# Patient Record
Sex: FEMALE | URBAN - METROPOLITAN AREA
[De-identification: names, ages, dates, MRNs, and addresses within clinical notes are randomized per-mention and may not be internally consistent; named-entity substitution may affect disease eponyms.]

---

## 2024-01-16 ENCOUNTER — HOSPITAL ENCOUNTER (OUTPATIENT)
Facility: AMBULATORY SURGERY CENTER | Age: 34
End: 2024-01-16
Attending: OBSTETRICS & GYNECOLOGY

## 2024-01-23 RX ORDER — SODIUM CHLORIDE, SODIUM LACTATE, POTASSIUM CHLORIDE, CALCIUM CHLORIDE 600; 310; 30; 20 MG/100ML; MG/100ML; MG/100ML; MG/100ML
INJECTION, SOLUTION INTRAVENOUS CONTINUOUS
Status: CANCELLED | OUTPATIENT
Start: 2024-01-23

## 2024-01-23 RX ORDER — LIDOCAINE 40 MG/G
CREAM TOPICAL
Status: CANCELLED | OUTPATIENT
Start: 2024-01-23

## 2024-01-23 RX ORDER — ACETAMINOPHEN 325 MG/1
975 TABLET ORAL ONCE
Status: CANCELLED | OUTPATIENT
Start: 2024-01-23 | End: 2024-01-23

## 2024-01-24 ENCOUNTER — ANESTHESIA EVENT (OUTPATIENT)
Dept: SURGERY | Facility: AMBULATORY SURGERY CENTER | Age: 34
End: 2024-01-24

## 2024-01-24 ENCOUNTER — ANESTHESIA (OUTPATIENT)
Dept: SURGERY | Facility: AMBULATORY SURGERY CENTER | Age: 34
End: 2024-01-24

## 2024-01-24 ENCOUNTER — HOSPITAL ENCOUNTER (OUTPATIENT)
Facility: AMBULATORY SURGERY CENTER | Age: 34
Discharge: HOME | End: 2024-01-24
Attending: OBSTETRICS & GYNECOLOGY

## 2024-01-24 VITALS
DIASTOLIC BLOOD PRESSURE: 74 MMHG | RESPIRATION RATE: 16 BRPM | OXYGEN SATURATION: 100 % | HEIGHT: 65 IN | SYSTOLIC BLOOD PRESSURE: 122 MMHG | HEART RATE: 50 BPM | BODY MASS INDEX: 28.32 KG/M2 | TEMPERATURE: 97.7 F | WEIGHT: 170 LBS

## 2024-01-24 DIAGNOSIS — N87.1 CIN II (CERVICAL INTRAEPITHELIAL NEOPLASIA II): ICD-10-CM

## 2024-01-24 DIAGNOSIS — Z98.890 S/P LEEP: Primary | ICD-10-CM

## 2024-01-24 LAB
HCG UR QL: NEGATIVE
INTERNAL QC OK POCT: NORMAL
POCT KIT EXPIRATION DATE: NORMAL
POCT KIT LOT NUMBER: NORMAL

## 2024-01-24 RX ORDER — DEXAMETHASONE SODIUM PHOSPHATE 4 MG/ML
INJECTION, SOLUTION INTRA-ARTICULAR; INTRALESIONAL; INTRAMUSCULAR; INTRAVENOUS; SOFT TISSUE PRN
Status: DISCONTINUED | OUTPATIENT
Start: 2024-01-24 | End: 2024-01-24

## 2024-01-24 RX ORDER — ACETAMINOPHEN 325 MG/1
975 TABLET ORAL ONCE
Status: DISCONTINUED | OUTPATIENT
Start: 2024-01-24 | End: 2024-01-25 | Stop reason: HOSPADM

## 2024-01-24 RX ORDER — OXYCODONE HYDROCHLORIDE 5 MG/1
5 TABLET ORAL
Status: DISCONTINUED | OUTPATIENT
Start: 2024-01-24 | End: 2024-01-25 | Stop reason: HOSPADM

## 2024-01-24 RX ORDER — OXYCODONE HYDROCHLORIDE 5 MG/1
5 TABLET ORAL EVERY 4 HOURS PRN
Qty: 6 TABLET | Refills: 0 | Status: SHIPPED | OUTPATIENT
Start: 2024-01-24

## 2024-01-24 RX ORDER — OXYCODONE HYDROCHLORIDE 10 MG/1
10 TABLET ORAL
Status: DISCONTINUED | OUTPATIENT
Start: 2024-01-24 | End: 2024-01-25 | Stop reason: HOSPADM

## 2024-01-24 RX ORDER — ONDANSETRON 2 MG/ML
4 INJECTION INTRAMUSCULAR; INTRAVENOUS EVERY 30 MIN PRN
Status: DISCONTINUED | OUTPATIENT
Start: 2024-01-24 | End: 2024-01-25 | Stop reason: HOSPADM

## 2024-01-24 RX ORDER — LIDOCAINE 40 MG/G
CREAM TOPICAL
Status: DISCONTINUED | OUTPATIENT
Start: 2024-01-24 | End: 2024-01-25 | Stop reason: HOSPADM

## 2024-01-24 RX ORDER — FENTANYL CITRATE 0.05 MG/ML
50 INJECTION, SOLUTION INTRAMUSCULAR; INTRAVENOUS EVERY 5 MIN PRN
Status: DISCONTINUED | OUTPATIENT
Start: 2024-01-24 | End: 2024-01-25 | Stop reason: HOSPADM

## 2024-01-24 RX ORDER — FENTANYL CITRATE 0.05 MG/ML
25 INJECTION, SOLUTION INTRAMUSCULAR; INTRAVENOUS EVERY 5 MIN PRN
Status: DISCONTINUED | OUTPATIENT
Start: 2024-01-24 | End: 2024-01-25 | Stop reason: HOSPADM

## 2024-01-24 RX ORDER — FENTANYL CITRATE 50 UG/ML
INJECTION, SOLUTION INTRAMUSCULAR; INTRAVENOUS PRN
Status: DISCONTINUED | OUTPATIENT
Start: 2024-01-24 | End: 2024-01-24

## 2024-01-24 RX ORDER — ACETAMINOPHEN 325 MG/1
975 TABLET ORAL ONCE
Status: COMPLETED | OUTPATIENT
Start: 2024-01-24 | End: 2024-01-24

## 2024-01-24 RX ORDER — ONDANSETRON 4 MG/1
4 TABLET, ORALLY DISINTEGRATING ORAL EVERY 30 MIN PRN
Status: DISCONTINUED | OUTPATIENT
Start: 2024-01-24 | End: 2024-01-25 | Stop reason: HOSPADM

## 2024-01-24 RX ORDER — IBUPROFEN 800 MG/1
800 TABLET, FILM COATED ORAL EVERY 6 HOURS PRN
Qty: 30 TABLET | Refills: 0 | Status: SHIPPED | OUTPATIENT
Start: 2024-01-24

## 2024-01-24 RX ORDER — PROPOFOL 10 MG/ML
INJECTION, EMULSION INTRAVENOUS CONTINUOUS PRN
Status: DISCONTINUED | OUTPATIENT
Start: 2024-01-24 | End: 2024-01-24

## 2024-01-24 RX ORDER — PROPOFOL 10 MG/ML
INJECTION, EMULSION INTRAVENOUS PRN
Status: DISCONTINUED | OUTPATIENT
Start: 2024-01-24 | End: 2024-01-24

## 2024-01-24 RX ORDER — HYDROMORPHONE HCL IN WATER/PF 6 MG/30 ML
0.4 PATIENT CONTROLLED ANALGESIA SYRINGE INTRAVENOUS EVERY 5 MIN PRN
Status: DISCONTINUED | OUTPATIENT
Start: 2024-01-24 | End: 2024-01-25 | Stop reason: HOSPADM

## 2024-01-24 RX ORDER — HYDROMORPHONE HCL IN WATER/PF 6 MG/30 ML
0.2 PATIENT CONTROLLED ANALGESIA SYRINGE INTRAVENOUS EVERY 5 MIN PRN
Status: DISCONTINUED | OUTPATIENT
Start: 2024-01-24 | End: 2024-01-25 | Stop reason: HOSPADM

## 2024-01-24 RX ORDER — IBUPROFEN 800 MG/1
800 TABLET, FILM COATED ORAL ONCE
Status: DISCONTINUED | OUTPATIENT
Start: 2024-01-24 | End: 2024-01-25 | Stop reason: HOSPADM

## 2024-01-24 RX ORDER — ONDANSETRON 2 MG/ML
INJECTION INTRAMUSCULAR; INTRAVENOUS PRN
Status: DISCONTINUED | OUTPATIENT
Start: 2024-01-24 | End: 2024-01-24

## 2024-01-24 RX ORDER — ACETAMINOPHEN 325 MG/1
975 TABLET ORAL ONCE
Status: CANCELLED | OUTPATIENT
Start: 2024-01-24 | End: 2024-01-24

## 2024-01-24 RX ORDER — SODIUM CHLORIDE, SODIUM LACTATE, POTASSIUM CHLORIDE, CALCIUM CHLORIDE 600; 310; 30; 20 MG/100ML; MG/100ML; MG/100ML; MG/100ML
INJECTION, SOLUTION INTRAVENOUS CONTINUOUS
Status: DISCONTINUED | OUTPATIENT
Start: 2024-01-24 | End: 2024-01-25 | Stop reason: HOSPADM

## 2024-01-24 RX ORDER — ACETIC ACID 3 %
LIQUID (ML) MISCELLANEOUS PRN
Status: DISCONTINUED | OUTPATIENT
Start: 2024-01-24 | End: 2024-01-24 | Stop reason: HOSPADM

## 2024-01-24 RX ORDER — KETOROLAC TROMETHAMINE 30 MG/ML
INJECTION, SOLUTION INTRAMUSCULAR; INTRAVENOUS PRN
Status: DISCONTINUED | OUTPATIENT
Start: 2024-01-24 | End: 2024-01-24

## 2024-01-24 RX ORDER — MEPERIDINE HYDROCHLORIDE 25 MG/ML
12.5 INJECTION INTRAMUSCULAR; INTRAVENOUS; SUBCUTANEOUS EVERY 5 MIN PRN
Status: DISCONTINUED | OUTPATIENT
Start: 2024-01-24 | End: 2024-01-25 | Stop reason: HOSPADM

## 2024-01-24 RX ORDER — KETOROLAC TROMETHAMINE 15 MG/ML
15 INJECTION, SOLUTION INTRAMUSCULAR; INTRAVENOUS
Status: DISCONTINUED | OUTPATIENT
Start: 2024-01-24 | End: 2024-01-25 | Stop reason: HOSPADM

## 2024-01-24 RX ADMIN — SODIUM CHLORIDE, SODIUM LACTATE, POTASSIUM CHLORIDE, CALCIUM CHLORIDE: 600; 310; 30; 20 INJECTION, SOLUTION INTRAVENOUS at 11:04

## 2024-01-24 RX ADMIN — FENTANYL CITRATE 50 MCG: 50 INJECTION, SOLUTION INTRAMUSCULAR; INTRAVENOUS at 12:23

## 2024-01-24 RX ADMIN — PROPOFOL 20 MG: 10 INJECTION, EMULSION INTRAVENOUS at 12:15

## 2024-01-24 RX ADMIN — ONDANSETRON 4 MG: 2 INJECTION INTRAMUSCULAR; INTRAVENOUS at 12:18

## 2024-01-24 RX ADMIN — PROPOFOL 20 MG: 10 INJECTION, EMULSION INTRAVENOUS at 12:13

## 2024-01-24 RX ADMIN — PROPOFOL 30 MG: 10 INJECTION, EMULSION INTRAVENOUS at 12:12

## 2024-01-24 RX ADMIN — FENTANYL CITRATE 50 MCG: 50 INJECTION, SOLUTION INTRAMUSCULAR; INTRAVENOUS at 12:15

## 2024-01-24 RX ADMIN — ACETAMINOPHEN 975 MG: 325 TABLET ORAL at 11:05

## 2024-01-24 RX ADMIN — DEXAMETHASONE SODIUM PHOSPHATE 4 MG: 4 INJECTION, SOLUTION INTRA-ARTICULAR; INTRALESIONAL; INTRAMUSCULAR; INTRAVENOUS; SOFT TISSUE at 12:18

## 2024-01-24 RX ADMIN — KETOROLAC TROMETHAMINE 15 MG: 30 INJECTION, SOLUTION INTRAMUSCULAR; INTRAVENOUS at 12:28

## 2024-01-24 RX ADMIN — PROPOFOL 200 MCG/KG/MIN: 10 INJECTION, EMULSION INTRAVENOUS at 12:12

## 2024-01-24 NOTE — ANESTHESIA POSTPROCEDURE EVALUATION
Patient: Uvaldo Monroy    Procedure: Procedure(s):  LOOP ELECTROSURGICAL EXCISION PROCEDURE (LEEP)       Anesthesia Type:  MAC    Note:  Disposition: Outpatient   Postop Pain Control: Uneventful            Sign Out: Well controlled pain   PONV: No   Neuro/Psych: Uneventful            Sign Out: Acceptable/Baseline neuro status   Airway/Respiratory: Uneventful            Sign Out: Acceptable/Baseline resp. status   CV/Hemodynamics: Uneventful            Sign Out: Acceptable CV status; No obvious hypovolemia; No obvious fluid overload   Other NRE:    DID A NON-ROUTINE EVENT OCCUR?            Last vitals:  Vitals Value Taken Time   /79 01/24/24 1300   Temp     Pulse 50 01/24/24 1308   Resp 16 01/24/24 1245   SpO2 100 % 01/24/24 1308   Vitals shown include unfiled device data.    Electronically Signed By: Rios Stein MD  January 24, 2024  1:10 PM

## 2024-01-24 NOTE — INTERVAL H&P NOTE
"I have reviewed the surgical (or preoperative) H&P that is linked to this encounter, and examined the patient. There are no significant changes    Clinical Conditions Present on Arrival:                # Overweight: Estimated body mass index is 28.29 kg/m  as calculated from the following:    Height as of this encounter: 1.651 m (5' 5\").    Weight as of this encounter: 77.1 kg (170 lb).     Jodee Rodriguez, DO  [facility]  [phone]    "

## 2024-01-24 NOTE — ANESTHESIA PREPROCEDURE EVALUATION
"Anesthesia Pre-Procedure Evaluation    Patient: Uvaldo Monroy   MRN: [id number] : 1990        Procedure : Procedure(s):  LOOP ELECTROSURGICAL EXCISION PROCEDURE (LEEP)          History reviewed. No pertinent past medical history.   History reviewed. No pertinent surgical history.   No Known Allergies   Social History     Tobacco Use     Smoking status: Every Day     Types: Other     Smokeless tobacco: Never     Tobacco comments:     Current vaping    Substance Use Topics     Alcohol use: Yes     Comment: social      Wt Readings from Last 1 Encounters:   24 77.1 kg (170 lb)        Anesthesia Evaluation            ROS/MED HX  ENT/Pulmonary:  - neg pulmonary ROS     Neurologic:  - neg neurologic ROS     Cardiovascular:  - neg cardiovascular ROS     METS/Exercise Tolerance: >4 METS    Hematologic:  - neg hematologic  ROS     Musculoskeletal:  - neg musculoskeletal ROS     GI/Hepatic:  - neg GI/hepatic ROS     Renal/Genitourinary:  - neg Renal ROS     Endo:  - neg endo ROS     Psychiatric/Substance Use:  - neg psychiatric ROS     Infectious Disease:  - neg infectious disease ROS     Malignancy:  - neg malignancy ROS     Other:  - neg other ROS          Physical Exam    Airway  airway exam normal      Mallampati: I       Respiratory Devices and Support         Dental         B=Bridge, C=Chipped, L=Loose, M=Missing    Cardiovascular   cardiovascular exam normal          Pulmonary   pulmonary exam normal              OUTSIDE LABS:  CBC: No results found for: \"WBC\", \"HGB\", \"HCT\", \"PLT\"  BMP: No results found for: \"NA\", \"POTASSIUM\", \"CHLORIDE\", \"CO2\", \"BUN\", \"CR\", \"GLC\"  COAGS: No results found for: \"PTT\", \"INR\", \"FIBR\"  POC:   Lab Results   Component Value Date    HCG Negative 2024     HEPATIC: No results found for: \"ALBUMIN\", \"PROTTOTAL\", \"ALT\", \"AST\", \"GGT\", \"ALKPHOS\", \"BILITOTAL\", \"BILIDIRECT\", \"NICOLE\"  OTHER: No results found for: \"PH\", \"LACT\", \"A1C\", \"MICHELINE\", \"PHOS\", \"MAG\", \"LIPASE\", \"AMYLASE\", \"TSH\", \"T4\", " "\"T3\", \"CRP\", \"SED\"    Anesthesia Plan    ASA Status:  1       Anesthesia Type: MAC.     - Reason for MAC: straight local not clinically adequate, immobility needed              Consents    Anesthesia Plan(s) and associated risks, benefits, and realistic alternatives discussed. Questions answered and patient/representative(s) expressed understanding.     - Discussed:     - Discussed with:  Patient            Postoperative Care       PONV prophylaxis: Ondansetron (or other 5HT-3), Dexamethasone or Solumedrol     Comments:    Other Comments: MAC - propofol gtt, fentanyl/ketamine bolus PRN for pain  Decadron/Zofran for antiemesis  Convert to General with LMA if unable to maintain adequate SpO2   Reviewed anesthetic options and risks. Patient agrees to proceed             Ruddy Rothman MD    I have reviewed the pertinent notes and labs in the chart from the past 30 days and (re)examined the patient.  Any updates or changes from those notes are reflected in this note.              # Overweight: Estimated body mass index is 28.29 kg/m  as calculated from the following:    Height as of this encounter: 1.651 m (5' 5\").    Weight as of this encounter: 77.1 kg (170 lb).      "

## 2024-01-24 NOTE — DISCHARGE INSTRUCTIONS
If you have any questions or concerns regarding your procedure, please contact Dr. Jodee Rodriguez, her office number is [phone].     [facility] Post Op Pain Medication  Next Dose  Instructions:     __ Acetaminophen (Tylenol): Next Dose: ________pm. Take 650-1,000 mg every 6 hours for mild to moderate pain.     *As an adult, you should not exceed 1,000 mg of acetaminophen (Tylenol) per dose or 4,000 mg per day. If you are taking a narcotic that contains acetaminophen (Tylenol) keep track of your Tylenol dosage.      __ Ibuprofen (Motrin, Advil, Aleve, etc.): Next Dose: ________pm. Take 600-800 mg every 6 hours for mild to moderate pain.     *As an adult, you should not exceed 800 mg of ibuprofen per dose or 3,200 mg per day.     __ Hydrocodone-Tylenol (Norco): Next Dose: _______.  Take ____ tab(s) every ___ hours for moderate to severe pain.     __ Oxycodone: Next Dose: _______.  Take ___ tab(s) every ___ hours for moderate to severe pain.     __ Oxycodone-Tylenol (Percocet): Next Dose: _______.  Take ___ tab(s) every ___ hours for moderate to severe pain.     __ Tramadol: Next Dose: _______. Take 1 tablet every 6 hours as needed for moderate to severe pain.       *If you are prescribed narcotic pain medications (Oxycodone, Norco, Percocet, Tylenol #3, Dilaudid, etc.) then you should try to wean off them as tolerated. These are AS NEEDED medications, so if you are not having significant pain you should try to take fewer pills at a time or spread the doses out over a longer period of time than is written on the prescription. As an example, if you are prescribed 1-2 pills of pain medication every 4 hours AS NEEDED for pain, then you should begin taking only 1 pill every 4 hours as your pain tolerates. Then when 1 pill is giving good pain relief you should try to spread the doses out longer than 4 hours apart as you can tolerate it.       __ Ondansetron (Zofran): Next Dose: _______. Take every ___  hours as needed for nausea and vomiting.     __ Antibiotics: You have been prescribed ____________________. Take this medication as instructed on the bottle. Make sure to take this medication until the pills are gone.     __ Senna-Docusate (Stool Softener): Next dose: _______. Take as instructed on the bottle. This is an over-the-counter medication. Take this while taking narcotic medications to prevent constipation.     __ Scopolamine Patch: You have a patch behind your ear.     1. You may leave the patch behind your ear for up to 72 hours (about 3 days), but NO LONGER.  You might experience withdrawal symptoms (nausea, vomiting, headache, dizziness) if used longer.     2. When you remove the patch, you must wash and dry your hands thoroughly and before touching your eyes, as pupils may dilate.     3. Discard patch (away from children and pets).     4. May develop some urinary hesitancy or urine retention.     5.  Patch should be removed by: ___________________

## 2024-01-24 NOTE — OP NOTE
PROCEDURE NOTE: LEEP     NAME: Uvaldo oMnroy  : 1990   MRN: [id number]     DATE OF SERVICE: 2024     PREOPERATIVE DIAGNOSIS :  CIN2    POSTOPERATIVE DIAGNOSIS: Cervical Dysplasia    PROCEDURE:  Loop Electrosurgical Excision Procedure [facility] Biopsy and endocervical biopsy    SURGEON:  Jodee Rodriguez DO     ANESTHESIA:  MAC     CATHETER:  none    SPECIMEN:  superior portion of cervix with stitch at 12 o clock, inferior portion of cervix with stitch at 6 o clock, ECC    COMPLICATIONS:  None    ESTIMATED BLOOD LOSS: minimal    CONDITION ON DISCHARGE: Satisfactory    HISTORY   This is a 33 year old  female with cervical dysplasia noted on colpo of SPENCER 2. The risks, benefits, and alternatives were discussed. She expressed understanding and wished to proceed.     PROCEDURE NOTE   The patient was brought to the operating room and after induction of IV sedation, was placed in the dorsal lithotomy position. A time out was called and the patient and the procedure were verified.  A non-conducting bivalve speculum was placed. A white (20mm X 12mm loop was then used to carve out a cervical specimen a superior portion and an inferior portion which was tagged with long stitch at 12 o clock of superior portion, and short stitch at 6 o clock of inferior portion. ECC was then performed. This was submitted for pathologic exam. A 5mm ball-tipped cautery was then used to cauterize the cervical stump. Monsel solution was then applied. Good hemostasis was noted. At this time decision was made to terminate the procedure. All the instruments were removed and the patient was taken to the recovery room in good condition.           CC: No primary care provider on file., Jodee Rodriguez DO

## 2024-01-24 NOTE — ANESTHESIA CARE TRANSFER NOTE
Patient: Uvaldo Monroy    Procedure: Procedure(s):  LOOP ELECTROSURGICAL EXCISION PROCEDURE (LEEP)       Diagnosis: SPENCER II (cervical intraepithelial neoplasia II) [N87.1]  Diagnosis Additional Information: No value filed.    Anesthesia Type:   MAC     Note:    Oropharynx: oropharynx clear of all foreign objects and spontaneously breathing  Level of Consciousness: awake and drowsy  Oxygen Supplementation: room air    Independent Airway: airway patency satisfactory and stable  Dentition: dentition unchanged  Vital Signs Stable: post-procedure vital signs reviewed and stable  Report to RN Given: handoff report given  Patient transferred to: Phase II    Handoff Report: Identifed the Patient, Identified the Reponsible Provider, Reviewed the pertinent medical history, Discussed the surgical course, Reviewed Intra-OP anesthesia mangement and issues during anesthesia, Set expectations for post-procedure period and Allowed opportunity for questions and acknowledgement of understanding      Vitals:  Vitals Value Taken Time   /57 01/24/24 1237   Temp 97.6 F 01/24/24 1237   Pulse 68 01/24/24 1238   Resp 16 01/24/24 1237   SpO2 97 % 01/24/24 1238   Vitals shown include unfiled device data.    Electronically Signed By: JUAN Alamo CRNA  January 24, 2024  12:40 PM